# Patient Record
Sex: FEMALE | Race: WHITE | Employment: FULL TIME | URBAN - METROPOLITAN AREA
[De-identification: names, ages, dates, MRNs, and addresses within clinical notes are randomized per-mention and may not be internally consistent; named-entity substitution may affect disease eponyms.]

---
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
## 2021-03-24 ENCOUNTER — OFFICE VISIT (OUTPATIENT)
Dept: FAMILY MEDICINE CLINIC | Facility: CLINIC | Age: 21
End: 2021-03-24
Payer: COMMERCIAL

## 2021-03-24 ENCOUNTER — LAB ENCOUNTER (OUTPATIENT)
Dept: LAB | Age: 21
End: 2021-03-24
Attending: PHYSICIAN ASSISTANT
Payer: COMMERCIAL

## 2021-03-24 VITALS
BODY MASS INDEX: 27.88 KG/M2 | TEMPERATURE: 98 F | WEIGHT: 142 LBS | DIASTOLIC BLOOD PRESSURE: 60 MMHG | HEIGHT: 60 IN | SYSTOLIC BLOOD PRESSURE: 122 MMHG | HEART RATE: 92 BPM | RESPIRATION RATE: 20 BRPM

## 2021-03-24 DIAGNOSIS — G44.229 CHRONIC TENSION-TYPE HEADACHE, NOT INTRACTABLE: ICD-10-CM

## 2021-03-24 DIAGNOSIS — G44.229 CHRONIC TENSION-TYPE HEADACHE, NOT INTRACTABLE: Primary | ICD-10-CM

## 2021-03-24 DIAGNOSIS — R73.01 ELEVATED FASTING BLOOD SUGAR: ICD-10-CM

## 2021-03-24 LAB
ALBUMIN SERPL-MCNC: 4.8 G/DL (ref 3.4–5)
ALBUMIN/GLOB SERPL: 1.4 {RATIO} (ref 1–2)
ALP LIVER SERPL-CCNC: 67 U/L
ALT SERPL-CCNC: 24 U/L
ANION GAP SERPL CALC-SCNC: 7 MMOL/L (ref 0–18)
AST SERPL-CCNC: 10 U/L (ref 15–37)
BASOPHILS # BLD AUTO: 0.04 X10(3) UL (ref 0–0.2)
BASOPHILS NFR BLD AUTO: 0.8 %
BILIRUB SERPL-MCNC: 0.7 MG/DL (ref 0.1–2)
BUN BLD-MCNC: 10 MG/DL (ref 7–18)
BUN/CREAT SERPL: 14.9 (ref 10–20)
CALCIUM BLD-MCNC: 10 MG/DL (ref 8.5–10.1)
CHLORIDE SERPL-SCNC: 105 MMOL/L (ref 98–112)
CO2 SERPL-SCNC: 27 MMOL/L (ref 21–32)
CREAT BLD-MCNC: 0.67 MG/DL
DEPRECATED HBV CORE AB SER IA-ACNC: 15.1 NG/ML
DEPRECATED RDW RBC AUTO: 41.7 FL (ref 35.1–46.3)
EOSINOPHIL # BLD AUTO: 0.08 X10(3) UL (ref 0–0.7)
EOSINOPHIL NFR BLD AUTO: 1.6 %
ERYTHROCYTE [DISTWIDTH] IN BLOOD BY AUTOMATED COUNT: 12.1 % (ref 11–15)
GLOBULIN PLAS-MCNC: 3.5 G/DL (ref 2.8–4.4)
GLUCOSE BLD-MCNC: 112 MG/DL (ref 70–99)
HCT VFR BLD AUTO: 40.6 %
HGB BLD-MCNC: 13.3 G/DL
IMM GRANULOCYTES # BLD AUTO: 0.01 X10(3) UL (ref 0–1)
IMM GRANULOCYTES NFR BLD: 0.2 %
LYMPHOCYTES # BLD AUTO: 2.24 X10(3) UL (ref 1.5–5)
LYMPHOCYTES NFR BLD AUTO: 45.6 %
M PROTEIN MFR SERPL ELPH: 8.3 G/DL (ref 6.4–8.2)
MCH RBC QN AUTO: 30.4 PG (ref 26–34)
MCHC RBC AUTO-ENTMCNC: 32.8 G/DL (ref 31–37)
MCV RBC AUTO: 92.9 FL
MONOCYTES # BLD AUTO: 0.55 X10(3) UL (ref 0.1–1)
MONOCYTES NFR BLD AUTO: 11.2 %
NEUTROPHILS # BLD AUTO: 1.99 X10 (3) UL (ref 1.5–7.7)
NEUTROPHILS # BLD AUTO: 1.99 X10(3) UL (ref 1.5–7.7)
NEUTROPHILS NFR BLD AUTO: 40.6 %
OSMOLALITY SERPL CALC.SUM OF ELEC: 288 MOSM/KG (ref 275–295)
PATIENT FASTING Y/N/NP: NO
PLATELET # BLD AUTO: 293 10(3)UL (ref 150–450)
POTASSIUM SERPL-SCNC: 4.5 MMOL/L (ref 3.5–5.1)
RBC # BLD AUTO: 4.37 X10(6)UL
SODIUM SERPL-SCNC: 139 MMOL/L (ref 136–145)
TSI SER-ACNC: 0.68 MIU/ML (ref 0.36–3.74)
VIT B12 SERPL-MCNC: 504 PG/ML (ref 193–986)
WBC # BLD AUTO: 4.9 X10(3) UL (ref 4–11)

## 2021-03-24 PROCEDURE — 36415 COLL VENOUS BLD VENIPUNCTURE: CPT | Performed by: PHYSICIAN ASSISTANT

## 2021-03-24 PROCEDURE — 3008F BODY MASS INDEX DOCD: CPT | Performed by: PHYSICIAN ASSISTANT

## 2021-03-24 PROCEDURE — 80050 GENERAL HEALTH PANEL: CPT | Performed by: PHYSICIAN ASSISTANT

## 2021-03-24 PROCEDURE — 83036 HEMOGLOBIN GLYCOSYLATED A1C: CPT | Performed by: PHYSICIAN ASSISTANT

## 2021-03-24 PROCEDURE — 99214 OFFICE O/P EST MOD 30 MIN: CPT | Performed by: PHYSICIAN ASSISTANT

## 2021-03-24 PROCEDURE — 3074F SYST BP LT 130 MM HG: CPT | Performed by: PHYSICIAN ASSISTANT

## 2021-03-24 PROCEDURE — 82607 VITAMIN B-12: CPT | Performed by: PHYSICIAN ASSISTANT

## 2021-03-24 PROCEDURE — 3078F DIAST BP <80 MM HG: CPT | Performed by: PHYSICIAN ASSISTANT

## 2021-03-24 PROCEDURE — 82728 ASSAY OF FERRITIN: CPT | Performed by: PHYSICIAN ASSISTANT

## 2021-03-24 NOTE — PROGRESS NOTES
Patient presents with:  Headache: started a year ago, whenever going out has headaches, thinks it could be motion sickness, and when going to the stores feeling tired  Other: father mention's that the mom has thyroid issue and wants to discuss that his faviola groomed and appropriately dressed. CARDIOVASCULAR: Regular rate and rhythm. PULMONOLOGY: Normal effort on room air. Clear to auscultation bilaterally. ABDOMEN: Soft, nontender, nondistended. No hepatosplenomegaly. No CVA tenderness.   HEENT: Normocephal

## 2021-03-25 LAB
EST. AVERAGE GLUCOSE BLD GHB EST-MCNC: 97 MG/DL (ref 68–126)
HBA1C MFR BLD HPLC: 5 % (ref ?–5.7)

## 2022-05-10 NOTE — PATIENT INSTRUCTIONS
Well-Child Checkup: 15 to 25 Years     Stay involved in your teen’s life. Make sure your teen knows you’re always there when he or she needs to talk. During the teen years, it’s important to keep having yearly checkups.  Your teen may be embarrassed · Body changes. The body grows and matures during puberty. Hair will grow in the pubic area and on other parts of the body. Girls grow breasts and menstruate (have monthly periods). A boy’s voice changes, becoming lower and deeper.  As the penis matures, er · Eat healthy. Your child should eat fruits, vegetables, lean meats, and whole grains every day. Less healthy foods—like french fries, candy, and chips—should be eaten rarely.  Some teens fall into the trap of snacking on junk food and fast food throughout · Encourage your teen to keep a consistent bedtime, even on weekends. Sleeping is easier when the body follows a routine. Don’t let your teen stay up too late at night or sleep in too long in the morning. · Help your teen wake up, if needed.  Go into the b · Set rules and limits around driving and use of the car. If your teen gets a ticket or has an accident, there should be consequences. Driving is a privilege that can be taken away if your child doesn’t follow the rules.   · Teach your child to make good de © 9176-2611 [address]. [address] [facility], [facility] [place]. All rights reserved. This information is not intended as a substitute for professional medical care. Always follow your healthcare professional's instructions. [Follow-Up] : a follow-up visit

## 2022-11-29 ENCOUNTER — OFFICE VISIT (OUTPATIENT)
Dept: FAMILY MEDICINE CLINIC | Facility: CLINIC | Age: 22
End: 2022-11-29
Payer: COMMERCIAL

## 2022-11-29 VITALS
WEIGHT: 128.19 LBS | HEIGHT: 60 IN | RESPIRATION RATE: 18 BRPM | BODY MASS INDEX: 25.17 KG/M2 | HEART RATE: 68 BPM | SYSTOLIC BLOOD PRESSURE: 110 MMHG | DIASTOLIC BLOOD PRESSURE: 70 MMHG

## 2022-11-29 DIAGNOSIS — N64.4 MASTALGIA: Primary | ICD-10-CM

## 2022-11-29 PROCEDURE — 3078F DIAST BP <80 MM HG: CPT | Performed by: PHYSICIAN ASSISTANT

## 2022-11-29 PROCEDURE — 99213 OFFICE O/P EST LOW 20 MIN: CPT | Performed by: PHYSICIAN ASSISTANT

## 2022-11-29 PROCEDURE — 3074F SYST BP LT 130 MM HG: CPT | Performed by: PHYSICIAN ASSISTANT

## 2022-11-29 PROCEDURE — 3008F BODY MASS INDEX DOCD: CPT | Performed by: PHYSICIAN ASSISTANT

## 2022-12-01 ENCOUNTER — HOSPITAL ENCOUNTER (OUTPATIENT)
Dept: MAMMOGRAPHY | Facility: HOSPITAL | Age: 22
Discharge: HOME | End: 2022-12-01
Attending: PHYSICIAN ASSISTANT
Payer: COMMERCIAL

## 2022-12-01 DIAGNOSIS — N64.4 MASTALGIA: ICD-10-CM

## 2022-12-01 PROCEDURE — 76642 ULTRASOUND BREAST LIMITED: CPT | Performed by: PHYSICIAN ASSISTANT

## 2024-05-06 ENCOUNTER — OFFICE VISIT (OUTPATIENT)
Dept: FAMILY MEDICINE CLINIC | Facility: CLINIC | Age: 24
End: 2024-05-06
Payer: COMMERCIAL

## 2024-05-06 VITALS
WEIGHT: 135 LBS | RESPIRATION RATE: 18 BRPM | TEMPERATURE: 99 F | HEIGHT: 62 IN | HEART RATE: 72 BPM | DIASTOLIC BLOOD PRESSURE: 63 MMHG | OXYGEN SATURATION: 99 % | BODY MASS INDEX: 24.84 KG/M2 | SYSTOLIC BLOOD PRESSURE: 117 MMHG

## 2024-05-06 DIAGNOSIS — J01.90 ACUTE RHINOSINUSITIS: Primary | ICD-10-CM

## 2024-05-06 RX ORDER — AMOXICILLIN AND CLAVULANATE POTASSIUM 875; 125 MG/1; MG/1
1 TABLET, FILM COATED ORAL 2 TIMES DAILY
Qty: 14 TABLET | Refills: 0 | Status: SHIPPED | OUTPATIENT
Start: 2024-05-06 | End: 2024-05-13

## 2024-05-06 NOTE — PROGRESS NOTES
CHIEF COMPLAINT:     Chief Complaint   Patient presents with    Cough     2-3 weeks, cough up green phlegm/mucus, post nasal drip, nasal congestion, denies fever, no otc        HPI:   Debbie Nagel is a 23 year old female who presents for upper respiratory symptoms for  4 weeks. Patient reports congestion, dry cough. Symptoms have been unchanged since onset.  Treating symptoms with claritin d.      Current Outpatient Medications   Medication Sig Dispense Refill    amoxicillin clavulanate 875-125 MG Oral Tab Take 1 tablet by mouth 2 (two) times daily for 7 days. 14 tablet 0    Adapalene 0.3 % External Gel Apply a pea size amount to the entire face, shoulders, back and chest each night 59 g 2    clindamycin 1 % External Lotion Apply a pea size amount to the entire face, back, shoulders, and chest each morning 60 mL 2    Multiple Vitamins-Minerals (MULTI-B-PLUS) Oral Tab Take by mouth daily.        History reviewed. No pertinent past medical history.   History reviewed. No pertinent surgical history.      Social History     Socioeconomic History    Marital status: Single   Occupational History    Occupation: Student   Tobacco Use    Smoking status: Never    Smokeless tobacco: Never   Vaping Use    Vaping status: Never Used   Substance and Sexual Activity    Alcohol use: No    Drug use: No   Other Topics Concern    Caffeine Concern Yes     Comment: 2-3 cup green tea every day    Sleep Concern No    Exercise Yes     Comment: 1 hour each morning- 6 days a week    Seat Belt Yes     Social Determinants of Health      Received from [facility]    Housing Stability         REVIEW OF SYSTEMS:   GENERAL: normal appetite  SKIN: no rashes or abnormal skin lesions  HEENT: See HPI  LUNGS: See HPI  CARDIOVASCULAR: denies chest pain or palpitations   GI: denies N/V/C or abdominal pain      EXAM:   /63   Pulse 72   Temp 99.1 °F (37.3 °C)   Resp 18   Ht 5' 2\" (1.575 m)   Wt 135 lb (61.2 kg)   LMP  05/03/2024 (Approximate)   SpO2 99%   BMI 24.69 kg/m²   GENERAL: well developed, well nourished,in no apparent distress  SKIN: no rashes,no suspicious lesions  HEAD: atraumatic, normocephalic.  no tenderness on palpation of  sinuses  EYES: conjunctiva clear, EOM intact  EARS: TM's pearly, no bulging, no retraction,no fluid, bony landmarks visible  NOSE: Nostrils patent, + nasal discharge, nasal mucosa red and inflamed   THROAT: Oral mucosa pink, moist. Posterior pharynx is not erythematous. no exudates. Tonsils 2/4.    NECK: Supple, non-tender  LUNGS: clear to auscultation bilaterally, no wheezes or rhonchi. Breathing is non labored.  CARDIO: RRR without murmur  EXTREMITIES: no cyanosis, clubbing or edema  LYMPH:  no lymphadenopathy.        ASSESSMENT AND PLAN:   Debbie Nagel is a 23 year old female who presents with upper respiratory symptoms that are consistent with    ASSESSMENT:   Encounter Diagnosis   Name Primary?    Acute rhinosinusitis Yes       PLAN: Meds as below.  Comfort care as described in Patient Instructions    Educated on supportive measures: ibuprofen/tylenol, hydration, zyrtec, flonase  Educated on s/s of worsening sx and when to seek higher level of care  Follow up with pcp if not improving    Meds & Refills for this Visit:  Requested Prescriptions     Signed Prescriptions Disp Refills    amoxicillin clavulanate 875-125 MG Oral Tab 14 tablet 0     Sig: Take 1 tablet by mouth 2 (two) times daily for 7 days.     Risks, benefits, and side effects of medication explained and discussed.    The patient indicates understanding of these issues and agrees to the plan.  The patient is asked to f/u with PCP if sx's persist or worsen.  There are no Patient Instructions on file for this visit.

## 2024-08-09 ENCOUNTER — OFFICE VISIT (OUTPATIENT)
Dept: FAMILY MEDICINE CLINIC | Facility: CLINIC | Age: 24
End: 2024-08-09
Payer: COMMERCIAL

## 2024-08-09 VITALS
SYSTOLIC BLOOD PRESSURE: 110 MMHG | HEART RATE: 88 BPM | DIASTOLIC BLOOD PRESSURE: 60 MMHG | OXYGEN SATURATION: 98 % | TEMPERATURE: 97 F | BODY MASS INDEX: 26 KG/M2 | WEIGHT: 142 LBS | RESPIRATION RATE: 16 BRPM

## 2024-08-09 DIAGNOSIS — R09.89 THROAT CLEARING: Primary | ICD-10-CM

## 2024-08-09 DIAGNOSIS — L73.8 FOLLICULITIS BARBAE: ICD-10-CM

## 2024-08-09 PROCEDURE — 3078F DIAST BP <80 MM HG: CPT | Performed by: NURSE PRACTITIONER

## 2024-08-09 PROCEDURE — 99213 OFFICE O/P EST LOW 20 MIN: CPT | Performed by: NURSE PRACTITIONER

## 2024-08-09 PROCEDURE — 3074F SYST BP LT 130 MM HG: CPT | Performed by: NURSE PRACTITIONER

## 2024-08-09 RX ORDER — NICOTINE POLACRILEX 4 MG/1
1 GUM, CHEWING ORAL DAILY
Qty: 30 TABLET | Refills: 0 | Status: SHIPPED | OUTPATIENT
Start: 2024-08-09 | End: 2024-09-08

## 2024-08-09 NOTE — PROGRESS NOTES
Chief Complaint   Patient presents with    Lump     Left armpit area noticed last week       HPI:  Presents with approx 2 month history of feeling of congestion in throat and noting need to clear throat frequently. Did present to an UC in May for similar symptoms. Reports was prescribed an antibiotic but did not complete them all, as she left town and forgot to bring them with her. Denies fever/chills, throat pain, nasal/sinus congestion, ear pain/pressure, SOB/UNDERWOOD, body aches or fatigue. Has not been treating with anything else.     Also notes approx 5 day history of painful red lump in right armpit. Reports is more painful with pressing on area. Denies injury to area. Denies fevers, drainage from area. Denies breast pain, lumps, dimpling or discharge from nipple. Has not been treating with anything.     History reviewed. No pertinent past medical history.    Patient Active Problem List   Diagnosis    Dysmenorrhea    Anxiety    Acne vulgaris       Current Outpatient Medications   Medication Sig Dispense Refill    Omeprazole 20 MG Oral Tab EC Take 1 tablet by mouth daily. 30 tablet 0    Adapalene 0.3 % External Gel Apply a pea size amount to the entire face, shoulders, back and chest each night 59 g 2    clindamycin 1 % External Lotion Apply a pea size amount to the entire face, back, shoulders, and chest each morning 60 mL 2    Multiple Vitamins-Minerals (MULTI-B-PLUS) Oral Tab Take by mouth daily.         Physical Exam  /60   Pulse 88   Temp 97 °F (36.1 °C)   Resp 16   Wt 142 lb (64.4 kg)   LMP 05/03/2024 (Approximate)   SpO2 98%   BMI 25.97 kg/m²   Constitutional: well developed, well nourished, in no apparent distress  HEENT: Normocephalic and atraumatic. Tympanic membranes clear bilat w/o bulging, erythema or air/fluid levels. Oropharynx is pink and moist without lesions. (-) PND.   Eyes: Conjunctivae are pink and moist without exudate or drainage. EOMI.  Neck: Normal range of motion. Neck supple.    Lymphadenopathy: No cervical adenopathy. No axillary lymphadenopathy.    Cardiovascular: Normal rate, regular rhythm.  No murmur.   Pulmonary/Chest: No respiratory distress. Effort normal. Breath sounds clear bilaterally. No wheezes, rhonchi or rales  Abd: soft, non-distended.   Skin: Skin is warm and dry. No rash noted. No pallor. Right axilla with 2mm erythematous elevated, tender lesion w/o fluctuance, induration or drainage.     A/P:    Encounter Diagnoses   Name Primary?    Throat clearing- suspect reflux. Omeprazole with famotidine wean as per patient instructions. Instructed to notify office if not improved in 7-10 days or if symptoms worsen. Verbalized understanding of instructions and agreeable to this plan of care.     Yes    Folliculitis barbae- bacitracin and warm compresses as per patient instructions.  Instructed to notify office if not improved in 7 days or if symptoms worsen. Verbalized understanding of instructions and agreeable to this plan of care.          No orders of the defined types were placed in this encounter.      Meds & Refills for this Visit:  Requested Prescriptions     Signed Prescriptions Disp Refills    Omeprazole 20 MG Oral Tab EC 30 tablet 0     Sig: Take 1 tablet by mouth daily.       Imaging & Consults:  None    No follow-ups on file.  Patient Instructions                             Take omeprazole, one tab, first thing in the morning, on an empty stomach. Do this for 4 week. After 4 weeks stop omeprazole and take over the counter Pepcid (famotidine) 20mg twice daily for 7 days. Then take Famotidine once daily, in the morning for 7 days. Then may stop.       Apply Bacitracin ointment to arm pit area twice daily for 7 days (available over the counter).     Apply warm, dry compress to area 2-3 times daily for 15-20 minutes each time.    All questions were answered and the patient understands the plan.

## 2024-08-09 NOTE — PATIENT INSTRUCTIONS
Take omeprazole, one tab, first thing in the morning, on an empty stomach. Do this for 4 week. After 4 weeks stop omeprazole and take over the counter Pepcid (famotidine) 20mg twice daily for 7 days. Then take Famotidine once daily, in the morning for 7 days. Then may stop.       Apply Bacitracin ointment to arm pit area twice daily for 7 days (available over the counter).     Apply warm, dry compress to area 2-3 times daily for 15-20 minutes each time.

## 2024-11-11 ENCOUNTER — TELEPHONE (OUTPATIENT)
Dept: FAMILY MEDICINE CLINIC | Facility: CLINIC | Age: 24
End: 2024-11-11

## 2024-11-11 DIAGNOSIS — Z00.00 ROUTINE HEALTH MAINTENANCE: Primary | ICD-10-CM

## 2024-11-11 NOTE — TELEPHONE ENCOUNTER
Please enter lab orders for the patient's upcoming physical appointment.     Physical scheduled:   Your appointments       Date & Time Appointment Department ([place])    Nov 15, 2024 10:00 AM CST Physical - Established with Concetta Patterson DO [facility] ([facility])              [facility]  [address] [place] [address] [place] [id number]  [phone]           Preferred lab: [facility] ([facility])     The patient has been notified to complete fasting labs prior to their physical appointment.     SHE WANTS TO GET EVERYTHING TO GET CHECK OVER. LIKE HER VITAMIN LEVELS, TO CHECK HER LEVELS FATIGUE/ IRON LEVELS.

## 2024-11-12 NOTE — TELEPHONE ENCOUNTER
Patient called back to followup on lab orders that she wants to get done before she see Dr. Patterson on Friday 11/15/2024.

## 2024-11-14 ENCOUNTER — LAB ENCOUNTER (OUTPATIENT)
Dept: LAB | Age: 24
End: 2024-11-14
Attending: FAMILY MEDICINE
Payer: COMMERCIAL

## 2024-11-14 DIAGNOSIS — Z00.00 ROUTINE HEALTH MAINTENANCE: ICD-10-CM

## 2024-11-14 LAB
ALBUMIN SERPL-MCNC: 4.7 G/DL (ref 3.2–4.8)
ALBUMIN/GLOB SERPL: 1.3 {RATIO} (ref 1–2)
ALP LIVER SERPL-CCNC: 54 U/L
ALT SERPL-CCNC: 13 U/L
ANION GAP SERPL CALC-SCNC: 6 MMOL/L (ref 0–18)
AST SERPL-CCNC: 20 U/L (ref ?–34)
BASOPHILS # BLD AUTO: 0.04 X10(3) UL (ref 0–0.2)
BASOPHILS NFR BLD AUTO: 0.9 %
BILIRUB SERPL-MCNC: 0.9 MG/DL (ref 0.3–1.2)
BUN BLD-MCNC: 11 MG/DL (ref 9–23)
CALCIUM BLD-MCNC: 10.5 MG/DL (ref 8.7–10.4)
CHLORIDE SERPL-SCNC: 106 MMOL/L (ref 98–112)
CHOLEST SERPL-MCNC: 175 MG/DL (ref ?–200)
CO2 SERPL-SCNC: 26 MMOL/L (ref 21–32)
CREAT BLD-MCNC: 0.76 MG/DL
EGFRCR SERPLBLD CKD-EPI 2021: 113 ML/MIN/1.73M2 (ref 60–?)
EOSINOPHIL # BLD AUTO: 0.06 X10(3) UL (ref 0–0.7)
EOSINOPHIL NFR BLD AUTO: 1.4 %
ERYTHROCYTE [DISTWIDTH] IN BLOOD BY AUTOMATED COUNT: 11.9 %
EST. AVERAGE GLUCOSE BLD GHB EST-MCNC: 100 MG/DL (ref 68–126)
FASTING PATIENT LIPID ANSWER: YES
FASTING STATUS PATIENT QL REPORTED: YES
GLOBULIN PLAS-MCNC: 3.7 G/DL (ref 2–3.5)
GLUCOSE BLD-MCNC: 85 MG/DL (ref 70–99)
HBA1C MFR BLD: 5.1 % (ref ?–5.7)
HCT VFR BLD AUTO: 37.6 %
HDLC SERPL-MCNC: 74 MG/DL (ref 40–59)
HGB BLD-MCNC: 13 G/DL
IMM GRANULOCYTES # BLD AUTO: 0.01 X10(3) UL (ref 0–1)
IMM GRANULOCYTES NFR BLD: 0.2 %
LDLC SERPL CALC-MCNC: 91 MG/DL (ref ?–100)
LYMPHOCYTES # BLD AUTO: 1.96 X10(3) UL (ref 1–4)
LYMPHOCYTES NFR BLD AUTO: 45.8 %
MCH RBC QN AUTO: 30 PG (ref 26–34)
MCHC RBC AUTO-ENTMCNC: 34.6 G/DL (ref 31–37)
MCV RBC AUTO: 86.6 FL
MONOCYTES # BLD AUTO: 0.46 X10(3) UL (ref 0.1–1)
MONOCYTES NFR BLD AUTO: 10.7 %
NEUTROPHILS # BLD AUTO: 1.75 X10 (3) UL (ref 1.5–7.7)
NEUTROPHILS # BLD AUTO: 1.75 X10(3) UL (ref 1.5–7.7)
NEUTROPHILS NFR BLD AUTO: 41 %
NONHDLC SERPL-MCNC: 101 MG/DL (ref ?–130)
OSMOLALITY SERPL CALC.SUM OF ELEC: 285 MOSM/KG (ref 275–295)
PLATELET # BLD AUTO: 250 10(3)UL (ref 150–450)
POTASSIUM SERPL-SCNC: 3.9 MMOL/L (ref 3.5–5.1)
PROT SERPL-MCNC: 8.4 G/DL (ref 5.7–8.2)
RBC # BLD AUTO: 4.34 X10(6)UL
SODIUM SERPL-SCNC: 138 MMOL/L (ref 136–145)
T3FREE SERPL-MCNC: 3.42 PG/ML (ref 2.4–4.2)
T4 FREE SERPL-MCNC: 1.4 NG/DL (ref 0.8–1.7)
TRIGL SERPL-MCNC: 51 MG/DL (ref 30–149)
TSI SER-ACNC: 0.3 UIU/ML (ref 0.55–4.78)
VLDLC SERPL CALC-MCNC: 8 MG/DL (ref 0–30)
WBC # BLD AUTO: 4.3 X10(3) UL (ref 4–11)

## 2024-11-14 PROCEDURE — 84439 ASSAY OF FREE THYROXINE: CPT | Performed by: FAMILY MEDICINE

## 2024-11-14 PROCEDURE — 84481 FREE ASSAY (FT-3): CPT | Performed by: FAMILY MEDICINE

## 2024-11-14 PROCEDURE — 83036 HEMOGLOBIN GLYCOSYLATED A1C: CPT | Performed by: FAMILY MEDICINE

## 2024-11-14 PROCEDURE — 80050 GENERAL HEALTH PANEL: CPT | Performed by: FAMILY MEDICINE

## 2024-11-14 PROCEDURE — 80061 LIPID PANEL: CPT | Performed by: FAMILY MEDICINE

## 2024-11-15 ENCOUNTER — OFFICE VISIT (OUTPATIENT)
Dept: FAMILY MEDICINE CLINIC | Facility: CLINIC | Age: 24
End: 2024-11-15
Payer: COMMERCIAL

## 2024-11-15 VITALS
TEMPERATURE: 99 F | RESPIRATION RATE: 16 BRPM | WEIGHT: 139.63 LBS | SYSTOLIC BLOOD PRESSURE: 112 MMHG | DIASTOLIC BLOOD PRESSURE: 62 MMHG | OXYGEN SATURATION: 99 % | BODY MASS INDEX: 26.71 KG/M2 | HEART RATE: 98 BPM | HEIGHT: 60.71 IN

## 2024-11-15 DIAGNOSIS — F41.8 SITUATIONAL ANXIETY: ICD-10-CM

## 2024-11-15 DIAGNOSIS — Z01.419 WELL WOMAN EXAM WITH ROUTINE GYNECOLOGICAL EXAM: Primary | ICD-10-CM

## 2024-11-15 DIAGNOSIS — R79.89 LOW TSH LEVEL: ICD-10-CM

## 2024-11-15 DIAGNOSIS — E83.52 SERUM CALCIUM ELEVATED: ICD-10-CM

## 2024-11-15 DIAGNOSIS — R00.2 PALPITATIONS: ICD-10-CM

## 2024-11-15 DIAGNOSIS — Z12.4 SCREENING FOR CERVICAL CANCER: ICD-10-CM

## 2024-11-15 PROCEDURE — 88175 CYTOPATH C/V AUTO FLUID REDO: CPT | Performed by: FAMILY MEDICINE

## 2024-11-15 RX ORDER — PROPRANOLOL HYDROCHLORIDE 10 MG/1
10 TABLET ORAL 3 TIMES DAILY
Qty: 30 TABLET | Refills: 0 | Status: SHIPPED | OUTPATIENT
Start: 2024-11-15

## 2024-11-15 NOTE — PROGRESS NOTES
SUBJECTIVE:  Chief Complaint   Patient presents with    Physical     WWE with pap    Arm Pain     Left arm pain for past two weeks, after surgery 10/28/24     HPI:    Had wisdom teeth out beginning of Sept under twilight sedation. Had to do an emergency stop because of elevated heart rate.  Had pain/discomfort at her wrist. Has a throbbing sensation along the wrist. Then Oct had to have full anesthesia for the removal of the other 3. Was told HR was high. During the surgery they blew one of her veins. Had to put in another IV. After surgery this persisted and now has improved.    Recent labs notable for elevated calcium at 10.4 with elevated protein to 8.4.  Also TSH low at 0.022.  Normal T3 and T4.    Health Maintenance:  Vaccines: reviewed as below. Indicated today: influenza, HPV, TDap  Immunization History   Administered Date(s) Administered    DTAP 10/19/2001, 01/14/2002, 03/18/2002, 08/13/2003, 08/29/2006    FLUZONE 6 months and older PFS 0.5 ml (61004) 09/20/2018    Flucelvax 0.5 Ml Quad PFS Single Dose 01/24/2018    HEP B 09/19/2001, 03/18/2002, 09/17/2002    HIB 10/19/2001, 01/14/2002, 03/18/2002, 08/13/2003    Hpv Virus Vaccine 9 Latanya Im 03/27/2018, 04/27/2018    IPV 10/19/2001, 01/14/2002, 08/13/2003, 08/29/2006    Influenza(Afluria)0.5ml QIV PFS 09/20/2018    MMR 08/13/2003, 08/29/2006    Meningococcal-Menactra 03/27/2018    Pneumococcal (Prevnar 7) 10/19/2001, 01/14/2002, 03/18/2002, 08/13/2003    Varicella 08/13/2003, 06/26/2008     Obesity screening: Body mass index is 26.63 kg/m².  Diabetes screening:  neg  Hypercholesterolemia screening:   Lab Results   Component Value Date    HDL 74 (H) 11/14/2024     Lab Results   Component Value Date    LDL 91 11/14/2024     Lab Results   Component Value Date    TRIG 51 11/14/2024        Depression screen: as above.  Does note significant anxiety.  Osteoporosis: No history of pathologic fractures. No steroid use, smoking, risk of falls, excessive alcohol  use.  Cervical Cancer screening: Last Pap: never    History of Abnormal Pap? no  Colon Cancer screening: Family history of colon cancer? no   Breast Cancer screening: Family history of breast cancer?no  STI: Desires testing for STIs? no  Tobacco use:  reports that she has never smoked. She has never used smokeless tobacco.    ROS: Negative unless stated above    HISTORY:  History reviewed. No pertinent past medical history.   History reviewed. No pertinent surgical history.   Family History   Problem Relation Age of Onset    Diabetes Maternal Grandfather     Heart Disease Paternal Grandmother     Diabetes Paternal Grandfather       Social History     Socioeconomic History    Marital status: Single   Occupational History    Occupation: Student   Tobacco Use    Smoking status: Never    Smokeless tobacco: Never   Vaping Use    Vaping status: Never Used   Substance and Sexual Activity    Alcohol use: Yes     Comment: twice a month    Drug use: No    Sexual activity: Not Currently   Other Topics Concern    Caffeine Concern Yes     Comment: 1 coffee daily    Sleep Concern No    Exercise Yes     Comment: 1 hour each morning- 6 days a week    Seat Belt Yes     Social Drivers of Health      Received from [facility]    Housing Stability        Allergies:  Allergies[1]    OBJECTIVE:  PHYSICAL EXAM:  Vitals:    11/15/24 0958   BP: 112/62   BP Location: Left arm   Pulse: 98   Resp: 16   Temp: 98.5 °F (36.9 °C)   TempSrc: Oral   SpO2: 99%   Weight: 139 lb 9.6 oz (63.3 kg)   Height: 5' 0.71\" (1.542 m)     Physical Examination: General appearance - alert, well appearing, and in no distress and normal appearing weight  Mental status - alert, oriented to person, place, and time, normal mood, behavior, speech, dress, motor activity, and thought processes  Ears - bilateral TM's and external ear canals normal  Neck - supple, no significant adenopathy  Chest - clear to auscultation, no wheezes, rales or rhonchi,  symmetric air entry  Heart - normal rate, regular rhythm, normal S1, S2, no murmurs, rubs, clicks or gallops  Abdomen - soft, nontender, nondistended, no masses or organomegaly  Breasts - breasts appear normal, no suspicious masses, no skin or nipple changes or axillary nodes  Pelvic - normal external genitalia, vulva, vagina, cervix, uterus and adnexa  Extremities - peripheral pulses normal, no pedal edema, no clubbing or cyanosis    EKG: Rate 71, sinus rhythm, normal axis, no ST segment elevations or depressions, no pathologic Q waves or inverted T waves.  Normal EKG.    ASSESSMENT & PLAN:  Debbie Nagel is a 24 year old female is here for Physical (WWE with pap) and Arm Pain (Left arm pain for past two weeks, after surgery 10/28/24)    Problem List Items Addressed This Visit    None  Visit Diagnoses       Well woman exam with routine gynecological exam    -  Primary    Relevant Medications    propranolol 10 MG Oral Tab    Screening for cervical cancer        Relevant Medications    propranolol 10 MG Oral Tab    Other Relevant Orders    ThinPrep PAP with HPV Reflex Request (Completed)    Image-Guided Pap Smear ([facility]) (Completed)    Palpitations        Relevant Medications    propranolol 10 MG Oral Tab    Other Relevant Orders    EKG with interpretation and Report -IN OFFICE [[id number]] (Completed)    Low TSH level        Relevant Medications    propranolol 10 MG Oral Tab    Other Relevant Orders    TSH and Free T4 [E]    Triiodothyronine (T3) Total [E]    Serum calcium elevated        Relevant Medications    propranolol 10 MG Oral Tab    Other Relevant Orders    PTH Intact with minerals    Situational anxiety        Relevant Medications    propranolol 10 MG Oral Tab          Debbie was seen today for physical and arm pain.    Diagnoses and all orders for this visit:    Well woman exam with routine gynecological exam  Routine health maintenance reviewed, discussed and updated as below. This includes: . Healthy diet  and exercise reviewed.     Screening for cervical cancer  -     ThinPrep PAP with HPV Reflex Request; Future    -     Image-Guided Pap Smear ([facility])    Palpitations  Unclear etiology.  Anxiety could be underlying cause however with low TSH, hyperthyroidism also could be a cause for palpitations.  Will start with propranolol to be taken as needed for elevated heart rate/palpitations/situational anxiety.  -     propranolol 10 MG Oral Tab; Take 1 tablet (10 mg total) by mouth 3 (three) times daily.  -     EKG with interpretation and Report -IN OFFICE [[id number]]    Low TSH level  Repeat labs in a few weeks.  -     TSH and Free T4 [E]; Future  -     Triiodothyronine (T3) Total [E]; Future    Serum calcium elevated  -     Unclear etiology.  Will repeat PTH Intact with minerals; Future    Situational anxiety  -    Recommend trial of propranolol 10 MG Oral Tab; Take 1 tablet (10 mg total) by mouth 3 (three) times daily prn      Call or return to clinic prn if these symptoms worsen or fail to improve as anticipated. RTC in 1 year.   Concetta Patterson DO  11/15/2024 10:30 AM    Note to Patient  The 21st Century Cures Act makes medical notes like these available to patients in the interest of transparency. However, be advised this is a medical document and is intended as exrc-vo-giar communication; it is written in medical language and may appear blunt, direct, or contain abbreviations or verbiage that are unfamiliar. Medical documents are intended to carry relevant information, facts as evident, and the clinical opinion of the practitioner.     This report has been produced using speech recognition software, and may contain errors related to grammar, punctuation, spelling, words or phrases unrecognized or not translated appropriately to text; these errors may be referred to the dictating provider for further clarification and/or addendum as needed.         [1] No Known Allergies

## 2024-11-19 LAB
ATRIAL RATE: 71 BPM
P AXIS: 53 DEGREES
P-R INTERVAL: 130 MS
Q-T INTERVAL: 362 MS
QRS DURATION: 94 MS
QTC CALCULATION (BEZET): 393 MS
R AXIS: 59 DEGREES
T AXIS: 35 DEGREES
VENTRICULAR RATE: 71 BPM

## 2024-11-25 LAB
.: NORMAL
.: NORMAL

## 2024-12-11 ENCOUNTER — LAB ENCOUNTER (OUTPATIENT)
Dept: LAB | Age: 24
End: 2024-12-11
Attending: FAMILY MEDICINE
Payer: COMMERCIAL

## 2024-12-11 DIAGNOSIS — R79.89 LOW TSH LEVEL: ICD-10-CM

## 2024-12-11 DIAGNOSIS — E83.52 SERUM CALCIUM ELEVATED: ICD-10-CM

## 2024-12-11 LAB
CALCIUM BLD-MCNC: 10.2 MG/DL (ref 8.7–10.4)
CREAT BLD-MCNC: 0.87 MG/DL
PHOSPHATE SERPL-MCNC: 3.9 MG/DL (ref 2.4–5.1)
PTH-INTACT SERPL-MCNC: 38.4 PG/ML (ref 18.5–88)
T3 SERPL-MCNC: 0.93 NG/ML (ref 0.6–1.81)
T4 FREE SERPL-MCNC: 1.4 NG/DL (ref 0.8–1.7)
THYROGLOB SERPL-MCNC: <15 U/ML (ref ?–60)
THYROPEROXIDASE AB SERPL-ACNC: 33 U/ML (ref ?–60)
TSI SER-ACNC: 0.47 UIU/ML (ref 0.55–4.78)

## 2024-12-11 PROCEDURE — 83970 ASSAY OF PARATHORMONE: CPT | Performed by: FAMILY MEDICINE

## 2024-12-11 PROCEDURE — 86800 THYROGLOBULIN ANTIBODY: CPT | Performed by: FAMILY MEDICINE

## 2024-12-11 PROCEDURE — 84443 ASSAY THYROID STIM HORMONE: CPT | Performed by: FAMILY MEDICINE

## 2024-12-11 PROCEDURE — 84445 ASSAY OF TSI GLOBULIN: CPT | Performed by: FAMILY MEDICINE

## 2024-12-11 PROCEDURE — 84480 ASSAY TRIIODOTHYRONINE (T3): CPT | Performed by: FAMILY MEDICINE

## 2024-12-11 PROCEDURE — 82310 ASSAY OF CALCIUM: CPT | Performed by: FAMILY MEDICINE

## 2024-12-11 PROCEDURE — 84100 ASSAY OF PHOSPHORUS: CPT | Performed by: FAMILY MEDICINE

## 2024-12-11 PROCEDURE — 84439 ASSAY OF FREE THYROXINE: CPT | Performed by: FAMILY MEDICINE

## 2024-12-11 PROCEDURE — 86376 MICROSOMAL ANTIBODY EACH: CPT | Performed by: FAMILY MEDICINE

## 2024-12-11 PROCEDURE — 82565 ASSAY OF CREATININE: CPT | Performed by: FAMILY MEDICINE

## 2024-12-13 LAB — THY STIM IMMUNO: <0.1 IU/L

## 2024-12-17 ENCOUNTER — PATIENT MESSAGE (OUTPATIENT)
Dept: FAMILY MEDICINE CLINIC | Facility: CLINIC | Age: 24
End: 2024-12-17

## 2024-12-18 DIAGNOSIS — R79.89 LOW TSH LEVEL: Primary | ICD-10-CM

## (undated) NOTE — LETTER
Date: 12/10/2018    Patient Name: Dane Rizzo          To Whom it may concern:    Patient was seen in clinic today for 12 pm appt.        Sincerely,          NAT Bowman

## (undated) NOTE — LETTER
[facility] [employer] Child [employer] Examination       Student's Name  Samm Cristina Title                           Date     Signature HEALTH HISTORY          TO BE COMPLETED AND SIGNED BY PARENT/GUARDIAN AND VERIFIED BY HEALTH CARE PROVIDER    ALLERGIES  (Food, drug, insect, other)  Patient has no known allergies.  MEDICATION  (List all prescribed or taken on a regular basis.)    Current PHYSICAL EXAMINATION REQUIREMENTS (head circumference if <33 years old):   /62   Pulse 76   Temp 98.3 °F (36.8 °C) (Oral)   Resp 18   Ht 60.5\"   Wt 140 lb   LMP 05/30/2019 (Approximate)   BMI 26.89 kg/m²     DIABETES SCREENING  BMI>85% age/sex  No Cardiovascular/HTN Yes  Nutritional status Yes    Respiratory Yes                   Diagnosis of Asthma: No Mental Health Yes        Currently Prescribed Asthma Medication:            Quick-relief  medication (e.g. Short Acting Beta Antagonist):  No